# Patient Record
Sex: FEMALE | Race: ASIAN | Employment: FULL TIME | URBAN - METROPOLITAN AREA
[De-identification: names, ages, dates, MRNs, and addresses within clinical notes are randomized per-mention and may not be internally consistent; named-entity substitution may affect disease eponyms.]

---

## 2017-03-23 PROCEDURE — 81001 URINALYSIS AUTO W/SCOPE: CPT | Performed by: INTERNAL MEDICINE

## 2017-10-17 ENCOUNTER — HOSPITAL ENCOUNTER (OUTPATIENT)
Dept: ULTRASOUND IMAGING | Age: 62
Discharge: HOME | End: 2017-10-17
Attending: INTERNAL MEDICINE
Payer: COMMERCIAL

## 2017-10-17 ENCOUNTER — HOSPITAL ENCOUNTER (OUTPATIENT)
Dept: MAMMOGRAPHY | Age: 62
Discharge: HOME | End: 2017-10-17
Attending: INTERNAL MEDICINE
Payer: COMMERCIAL

## 2017-10-17 ENCOUNTER — HOSPITAL ENCOUNTER (OUTPATIENT)
Dept: BONE DENSITY | Age: 62
Discharge: HOME | End: 2017-10-17
Attending: INTERNAL MEDICINE
Payer: COMMERCIAL

## 2017-10-17 DIAGNOSIS — Z85.3 PERSONAL HISTORY OF MALIGNANT NEOPLASM OF BREAST: ICD-10-CM

## 2017-10-17 DIAGNOSIS — Z13.820 SCREENING FOR OSTEOPOROSIS: ICD-10-CM

## 2017-10-17 PROCEDURE — 77061 BREAST TOMOSYNTHESIS UNI: CPT | Performed by: INTERNAL MEDICINE

## 2017-10-17 PROCEDURE — 77080 DXA BONE DENSITY AXIAL: CPT | Performed by: INTERNAL MEDICINE

## 2017-10-17 PROCEDURE — 77065 DX MAMMO INCL CAD UNI: CPT | Performed by: INTERNAL MEDICINE

## 2017-10-17 PROCEDURE — 76641 ULTRASOUND BREAST COMPLETE: CPT | Performed by: INTERNAL MEDICINE

## 2017-10-21 NOTE — PROGRESS NOTES
[facility] pt--DEXA shows mild osteopenia in femoral neck--rec--weight bearing exercise--at least 4x/week for 30 minutes and 1200 mg Ca+ daily--including whats in diet and Vitamin D --at least 1000 IU daily

## 2017-10-21 NOTE — PROGRESS NOTES
Telephone Information:  Mobile          [phone] Notified patient of result and DR recommendations.  Understanding verbalized

## 2017-10-21 NOTE — PROGRESS NOTES
No future appointments. Telephone Information:  Mobile          [phone] Notified patient of result and DR recommendations.  Understanding verbalized

## 2018-01-29 NOTE — PROGRESS NOTES
Thanks Rony--I will have my staff call her tomorrow and make sure she sees you if she hasn't seen someone.

## 2018-02-07 NOTE — PROGRESS NOTES
No future appointments. [facility] message sent. Current MyChart use confirmed by report in Epic.

## 2018-02-07 NOTE — PROGRESS NOTES
Routed to Surgery Scheduling for appointment. Referral in place; pt requires follow up with Dr. Raulito Machado

## 2018-08-14 ENCOUNTER — OFFICE VISIT (OUTPATIENT)
Dept: PAIN CLINIC | Facility: CLINIC | Age: 63
End: 2018-08-14
Payer: COMMERCIAL

## 2018-08-14 VITALS
BODY MASS INDEX: 19.66 KG/M2 | DIASTOLIC BLOOD PRESSURE: 88 MMHG | SYSTOLIC BLOOD PRESSURE: 132 MMHG | HEART RATE: 94 BPM | HEIGHT: 65 IN | OXYGEN SATURATION: 98 % | WEIGHT: 118 LBS

## 2018-08-14 DIAGNOSIS — G89.29 CHRONIC SCAPULAR PAIN: Primary | ICD-10-CM

## 2018-08-14 DIAGNOSIS — M89.8X1 CHRONIC SCAPULAR PAIN: Primary | ICD-10-CM

## 2018-08-14 PROCEDURE — 99203 OFFICE O/P NEW LOW 30 MIN: CPT | Performed by: ANESTHESIOLOGY

## 2018-08-14 NOTE — H&P
Name: Kassie Tavares   :    DOS: 2018     Chief complaint: Left scapular pain    History of present illness:  Kassie Tavares is a 61year old female who presents today for evaluation of 1-1/2 year history of focal pain along the left sca • High Cholesterol Mother    • Osteoporosis Chava Fleischer Mother    • Hypertension Father    • High Cholesterol Father    • Breast Cancer Self 46     Smoking status: Never Smoker                                                              Smokeless tobacco: N a history of focal pain along the upper third of the left scapular border. This response to nonsteroidals. There is no radicular component to her pain. Based upon her HPI and physical exam findings, I believe her pain is largely myofascial in nature.   Omi Gates

## 2018-08-14 NOTE — PATIENT INSTRUCTIONS
Refill policies:    • Allow 2-3 business days for refills; controlled substances may take longer.   • Contact your pharmacy at least 5 days prior to running out of medication and have them send an electronic request or submit request through the “request re entire amount billed. Precertification and Prior Authorizations: If your physician has recommended that you have a procedure or additional testing performed.   [facility]) will contact your insurance carrier to obtain pre-certi

## 2018-08-14 NOTE — PROGRESS NOTES
HPI:    Patient ID: Debbie Moore is a 61year old female. HPI    Review of Systems         Current Outpatient Prescriptions:  Ibuprofen (MOTRIN OR)  Disp:  Rfl:    ARTIFICIAL TEAR SOLUTION OP Apply 2 drops to eye 3 (three) times daily.  Disp:  Rfl:

## 2018-11-26 ENCOUNTER — APPOINTMENT (OUTPATIENT)
Dept: CT IMAGING | Facility: HOSPITAL | Age: 63
End: 2018-11-26
Attending: EMERGENCY MEDICINE
Payer: COMMERCIAL

## 2018-11-26 ENCOUNTER — APPOINTMENT (OUTPATIENT)
Dept: GENERAL RADIOLOGY | Facility: HOSPITAL | Age: 63
End: 2018-11-26
Attending: EMERGENCY MEDICINE
Payer: COMMERCIAL

## 2018-11-26 ENCOUNTER — HOSPITAL ENCOUNTER (EMERGENCY)
Facility: HOSPITAL | Age: 63
Discharge: HOME | End: 2018-11-26
Attending: EMERGENCY MEDICINE
Payer: COMMERCIAL

## 2018-11-26 VITALS
SYSTOLIC BLOOD PRESSURE: 137 MMHG | RESPIRATION RATE: 19 BRPM | HEART RATE: 71 BPM | TEMPERATURE: 98 F | WEIGHT: 120 LBS | OXYGEN SATURATION: 99 % | DIASTOLIC BLOOD PRESSURE: 68 MMHG | BODY MASS INDEX: 20 KG/M2

## 2018-11-26 DIAGNOSIS — I10 HYPERTENSION, UNSPECIFIED TYPE: ICD-10-CM

## 2018-11-26 DIAGNOSIS — R42 DIZZINESS: Primary | ICD-10-CM

## 2018-11-26 PROCEDURE — 84484 ASSAY OF TROPONIN QUANT: CPT | Performed by: EMERGENCY MEDICINE

## 2018-11-26 PROCEDURE — 85025 COMPLETE CBC W/AUTO DIFF WBC: CPT | Performed by: EMERGENCY MEDICINE

## 2018-11-26 PROCEDURE — 36415 COLL VENOUS BLD VENIPUNCTURE: CPT | Performed by: EMERGENCY MEDICINE

## 2018-11-26 PROCEDURE — 70450 CT HEAD/BRAIN W/O DYE: CPT | Performed by: EMERGENCY MEDICINE

## 2018-11-26 PROCEDURE — 71046 X-RAY EXAM CHEST 2 VIEWS: CPT | Performed by: EMERGENCY MEDICINE

## 2018-11-26 PROCEDURE — 99284 EMERGENCY DEPT VISIT MOD MDM: CPT | Performed by: EMERGENCY MEDICINE

## 2018-11-26 PROCEDURE — 80053 COMPREHEN METABOLIC PANEL: CPT | Performed by: EMERGENCY MEDICINE

## 2018-11-26 NOTE — ED INITIAL ASSESSMENT (HPI)
Pt here from [address] [place] [address] for evaluation of high BP and dizziness. Pt reports intermittent dizziness for last couple months, but today much worse. No fevers. No N/V. Intermittent diarrhea, none today.   Normal EKG done at immediate care

## 2018-11-27 NOTE — ED PROVIDER NOTES
Patient Seen in: [facility] Emergency Department    History   Patient presents with:  Dizziness (neurologic)    Stated Complaint: Dizziness today. Had one 5-10 min sharp pain in chest earlier today.  No further*    HPI    Patient is a 70-year-old fem a couple of minutes earlier today. No abdominal pain or GI symptoms.     She denies any preceding history of heart disease or cardiac risk factors such as hypertension prior to today, she has had borderline high cholesterol in the past but has not been arminda well-nourished, very pleasant adult female who is conversant, comfortable and well appearing. Alert and oriented in no distress. Eyes: EOMI PERRLA. Neuro: No focal neurologic deficits. No facial droop or slurred speech. CN II-XII intact.  Grossly kayce orders. RAINBOW DRAW BLUE   RAINBOW DRAW LAVENDER   RAINBOW DRAW LIGHT GREEN   RAINBOW DRAW GOLD   CBC W/ DIFFERENTIAL          EKG was reviewed from the immediate care center that she brought with her.   EKG:  Normal sinus rhythm with a ventricular rate [place] [address]  [phone]    Call in 1 day  for repeat blood pressure check        Medications Prescribed:  Discharge Medication List as of 11/26/2018  9:29 PM

## 2019-01-16 PROCEDURE — 81001 URINALYSIS AUTO W/SCOPE: CPT | Performed by: INTERNAL MEDICINE

## 2021-01-04 PROBLEM — G89.29 CHRONIC SCAPULAR PAIN: Status: RESOLVED | Noted: 2018-08-14 | Resolved: 2021-01-04

## 2021-01-04 PROBLEM — M89.8X1 CHRONIC SCAPULAR PAIN: Status: RESOLVED | Noted: 2018-08-14 | Resolved: 2021-01-04

## 2021-11-11 PROBLEM — M54.50 RIGHT-SIDED LOW BACK PAIN WITHOUT SCIATICA, UNSPECIFIED CHRONICITY: Status: ACTIVE | Noted: 2021-11-11

## 2023-09-07 PROCEDURE — 88341 IMHCHEM/IMCYTCHM EA ADD ANTB: CPT | Performed by: PATHOLOGY

## 2023-09-07 PROCEDURE — 88342 IMHCHEM/IMCYTCHM 1ST ANTB: CPT | Performed by: PATHOLOGY

## 2023-09-11 ENCOUNTER — LAB REQUISITION (OUTPATIENT)
Age: 68
End: 2023-09-11

## 2023-09-11 DIAGNOSIS — D48.9 NEOPLASM OF UNCERTAIN BEHAVIOR: ICD-10-CM

## 2024-10-02 ENCOUNTER — HOSPITAL ENCOUNTER (EMERGENCY)
Facility: HOSPITAL | Age: 69
Discharge: HOME | End: 2024-10-02
Attending: STUDENT IN AN ORGANIZED HEALTH CARE EDUCATION/TRAINING PROGRAM
Payer: MEDICARE

## 2024-10-02 VITALS
DIASTOLIC BLOOD PRESSURE: 79 MMHG | TEMPERATURE: 98 F | HEIGHT: 65 IN | OXYGEN SATURATION: 100 % | HEART RATE: 109 BPM | RESPIRATION RATE: 13 BRPM | SYSTOLIC BLOOD PRESSURE: 110 MMHG | WEIGHT: 107 LBS | BODY MASS INDEX: 17.83 KG/M2

## 2024-10-02 DIAGNOSIS — D64.9 ANEMIA, UNSPECIFIED TYPE: Primary | ICD-10-CM

## 2024-10-02 LAB
ANTIBODY SCREEN: NEGATIVE
BASOPHILS # BLD AUTO: 0.04 X10(3) UL (ref 0–0.2)
BASOPHILS NFR BLD AUTO: 1.2 %
EOSINOPHIL # BLD AUTO: 0.06 X10(3) UL (ref 0–0.7)
EOSINOPHIL NFR BLD AUTO: 1.9 %
ERYTHROCYTE [DISTWIDTH] IN BLOOD BY AUTOMATED COUNT: 25.2 %
HCT VFR BLD AUTO: 18.2 %
HGB BLD-MCNC: 5.6 G/DL
IMM GRANULOCYTES # BLD AUTO: 0.02 X10(3) UL (ref 0–1)
IMM GRANULOCYTES NFR BLD: 0.6 %
LYMPHOCYTES # BLD AUTO: 0.51 X10(3) UL (ref 1–4)
LYMPHOCYTES NFR BLD AUTO: 15.8 %
MCH RBC QN AUTO: 24.7 PG (ref 26–34)
MCHC RBC AUTO-ENTMCNC: 30.8 G/DL (ref 31–37)
MCV RBC AUTO: 80.2 FL
MONOCYTES # BLD AUTO: 0.75 X10(3) UL (ref 0.1–1)
MONOCYTES NFR BLD AUTO: 23.2 %
NEUTROPHILS # BLD AUTO: 1.85 X10 (3) UL (ref 1.5–7.7)
NEUTROPHILS # BLD AUTO: 1.85 X10(3) UL (ref 1.5–7.7)
NEUTROPHILS NFR BLD AUTO: 57.3 %
PLATELET # BLD AUTO: 95 10(3)UL (ref 150–450)
PLATELET MORPHOLOGY: NORMAL
RBC # BLD AUTO: 2.27 X10(6)UL
RH BLOOD TYPE: POSITIVE
RH BLOOD TYPE: POSITIVE
WBC # BLD AUTO: 3.2 X10(3) UL (ref 4–11)

## 2024-10-02 PROCEDURE — 86901 BLOOD TYPING SEROLOGIC RH(D): CPT | Performed by: STUDENT IN AN ORGANIZED HEALTH CARE EDUCATION/TRAINING PROGRAM

## 2024-10-02 PROCEDURE — 36415 COLL VENOUS BLD VENIPUNCTURE: CPT

## 2024-10-02 PROCEDURE — 99285 EMERGENCY DEPT VISIT HI MDM: CPT

## 2024-10-02 PROCEDURE — 86900 BLOOD TYPING SEROLOGIC ABO: CPT | Performed by: STUDENT IN AN ORGANIZED HEALTH CARE EDUCATION/TRAINING PROGRAM

## 2024-10-02 PROCEDURE — 85025 COMPLETE CBC W/AUTO DIFF WBC: CPT | Performed by: STUDENT IN AN ORGANIZED HEALTH CARE EDUCATION/TRAINING PROGRAM

## 2024-10-02 PROCEDURE — 86850 RBC ANTIBODY SCREEN: CPT | Performed by: STUDENT IN AN ORGANIZED HEALTH CARE EDUCATION/TRAINING PROGRAM

## 2024-10-02 PROCEDURE — 93005 ELECTROCARDIOGRAM TRACING: CPT

## 2024-10-02 PROCEDURE — 86920 COMPATIBILITY TEST SPIN: CPT

## 2024-10-02 PROCEDURE — 36430 TRANSFUSION BLD/BLD COMPNT: CPT

## 2024-10-02 PROCEDURE — 93010 ELECTROCARDIOGRAM REPORT: CPT

## 2024-10-02 RX ORDER — PALBOCICLIB 125 MG/1
125 CAPSULE ORAL DAILY
COMMUNITY

## 2024-10-02 RX ORDER — ONDANSETRON 8 MG/1
8 TABLET, ORALLY DISINTEGRATING ORAL EVERY 8 HOURS PRN
COMMUNITY

## 2024-10-02 RX ORDER — MORPHINE SULFATE 30 MG/1
30 TABLET, FILM COATED, EXTENDED RELEASE ORAL EVERY 12 HOURS SCHEDULED
COMMUNITY

## 2024-10-02 RX ORDER — DEXAMETHASONE 2 MG/1
4 TABLET ORAL 2 TIMES DAILY WITH MEALS
COMMUNITY

## 2024-10-02 RX ORDER — HYDROCODONE BITARTRATE AND ACETAMINOPHEN 5; 325 MG/1; MG/1
1 TABLET ORAL EVERY 6 HOURS PRN
COMMUNITY

## 2024-10-02 RX ORDER — PROCHLORPERAZINE MALEATE 10 MG
10 TABLET ORAL EVERY 6 HOURS PRN
COMMUNITY

## 2024-10-02 RX ORDER — METOPROLOL TARTRATE 25 MG/1
25 TABLET, FILM COATED ORAL 2 TIMES DAILY
COMMUNITY

## 2024-10-02 RX ORDER — METOPROLOL SUCCINATE 25 MG/1
25 TABLET, EXTENDED RELEASE ORAL DAILY
COMMUNITY

## 2024-10-02 NOTE — ED PROVIDER NOTES
History     Chief Complaint   Patient presents with    Abnormal Labs       HPI    69 year old female here for blood transfusion. Pt has hx of retroperitoneal sarcoma status postchemotherapy, on oral treatment at this time complicated by anemia had labs done by oncology clinic today showing hemoglobin of 5.9 and sent to the ER for transfusion.  For the past few days she has been feeling generalized fatigue, lightheadedness and  notes she appears more pale.  Typical symptoms for patient when she becomes anemic.  No bleeding or bruising.  No fevers.          Past Medical History:    Abnormal glucose    5/8/12--A1C--6.1    Breast cancer (HCC)    Left Breast-DCIS---s/p L-total mastectomy and no XRT and no Chemo    Hematuria    per pt--had a full bladder adn renal w/u and found to be negative    High blood pressure    High cholesterol    HTN (hypertension)    2/1/21---Started Benicar 5 mg daily    Liver lesion    R-lobe--1.2 cm lesion in liver--order for recheck given 2012--, sent to GI again 3/17'    Osteopenia    order given 3/17'--recheck    Postmenopausal    Sarcoma (HCC)       Past Surgical History:   Procedure Laterality Date    Colonoscopy  2008    Repeat 10 years    Colonoscopy  10/26/21= Normal    Repeat 2031    Colonoscopy N/A 10/26/2021    Procedure: COLONOSCOPY, with polypectomy;  Surgeon: Syed Recinos MD;  [facility]: [facility]    Mastectomy left  2007    DCIS    Other surgical history  4/26/12  [facility]    EGD (epig pain): normal, gastric bx negative for H pylori       Social History     Socioeconomic History    Marital status:    Tobacco Use    Smoking status: Never    Smokeless tobacco: Never   Substance and Sexual Activity    Alcohol use: Not Currently    Drug use: No    Sexual activity: Yes     Partners: Male   Other Topics Concern    Caffeine Concern Yes     Comment: COFFEE, 1 CUP, TEA 2-3 CUPS DAILY    Exercise Yes     Comment: HOT YOGA   Social History Narrative     , 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  Eyes:      Extraocular Movements: Extraocular movements intact.   Cardiovascular:      Rate and Rhythm: Normal rate.      Pulses: Normal pulses.   Pulmonary:      Effort: Pulmonary effort is normal. No respiratory distress.   Abdominal:      General: Abdomen is flat. There is distension.      Tenderness: There is no abdominal tenderness. There is no guarding.   Musculoskeletal:      Cervical back: Normal range of motion.   Skin:     General: Skin is warm.      Coloration: Skin is pale.   Neurological:      General: No focal deficit present.      Mental Status: She is alert.              ED Course     Labs Reviewed   CBC WITH DIFFERENTIAL WITH PLATELET - Abnormal; Notable for the following components:       Result Value    WBC 3.2 (*)     RBC 2.27 (*)     HGB 5.6 (*)     HCT 18.2 (*)     PLT 95.0 (*)     MCH 24.7 (*)     MCHC 30.8 (*)     Lymphocyte Absolute 0.51 (*)     All other components within normal limits   RBC MORPHOLOGY SCAN - Abnormal; Notable for the following components:    RBC Morphology See morphology below (*)     Hypochromia 2+ (*)     All other components within normal limits   SCAN SLIDE   TYPE AND SCREEN    Narrative:     The following orders were created for panel order Type and screen.  Procedure                               Abnormality         Status                     ---------                               -----------         ------                     ABORH (Blood Type)[[id number]]                               Final result               Antibody Screen[[id number]]                                  Final result                 Please view results for these tests on the individual  orders.   ABORH (BLOOD TYPE)   ANTIBODY SCREEN   PREPARE RBC   ABORH CONFIRMATION   RAINBOW DRAW LAVENDER   RAINBOW DRAW LIGHT GREEN     No results found.        [facility]     Vitals:    10/02/24 2130 10/02/24 2200 10/02/24 2230 10/02/24 2245   BP: 107/64 114/64 110/79    Pulse: 106 105 109    Resp: 13 15 13    Temp:    98.2 °F (36.8 °C)   TempSrc:    Temporal   SpO2: 100% 100% 100%    Weight:       Height:           Acute on chronic anemia in the setting of chemotherapy with underlying malignancy.  Patient is symptomatic, she is nontoxic-appearing otherwise.  Will plan to give blood transfusion, I will discuss with oncology, screening EKG. Reviewed chemistry sent from clinic, reassuring      ED Course as of 10/02/24 2332  ------------------------------------------------------------  Time: 10/02 1652  Comment: EKG interpretation by me: EKG sinus rhythm at a rate of 96, axis nomal, no concerning acute ischemic ST changes, nonspec changes  ------------------------------------------------------------  Time: 10/02 1902  Comment: confirm anemia with labs here, discussed with oncology, will give 2 units of blood and patient will be discharged with clinic follow-up.  ------------------------------------------------------------  Time: 10/02 2225  Comment: Patient remains hemodynamically stable and comfortable after transfusions.         Disposition and Plan     Clinical Impression:  1. Anemia, unspecified type        Disposition:  Discharge    Follow-up:  Sharad Vang MD  [address] [place] [address]  [address] [place] [address]  [phone]    Follow up        Medications Prescribed:  Discharge Medication List as of 10/2/2024 10:49 PM

## 2024-10-02 NOTE — ED INITIAL ASSESSMENT (HPI)
PT PRESENTS TO ED WITH LOW HEMOGLOBIN OF 5.9, HAS NAUSEA AND DIZZINESS AND SHORTNESS OF BREATH.  PT IS BEING TREATED FOR SARCOMA.  ARRIVES PALE, FATIGUED

## 2024-10-03 LAB
ATRIAL RATE: 96 BPM
P AXIS: -22 DEGREES
P-R INTERVAL: 140 MS
Q-T INTERVAL: 362 MS
QRS DURATION: 76 MS
QTC CALCULATION (BEZET): 457 MS
R AXIS: -10 DEGREES
T AXIS: -9 DEGREES
VENTRICULAR RATE: 96 BPM

## 2024-10-04 LAB
BLOOD TYPE BARCODE: 6200
UNIT VOLUME: 350 ML

## (undated) NOTE — ED AVS SNAPSHOT
Victoriano Scott   MRN: [id number]    Department:  [facility] Emergency Department   Date of Visit:  11/26/2018           Disclosure     Insurance plans vary and the physician(s) referred by the ER may not be covered by your plan.  Please contact yo tell this physician (or your personal doctor if your instructions are to return to your personal doctor) about any new or lasting problems. The primary care or specialist physician will see patients referred from the [facility] Emergency Department.  Giacomo Dasilva